# Patient Record
Sex: MALE | Race: WHITE | NOT HISPANIC OR LATINO | Employment: FULL TIME | URBAN - METROPOLITAN AREA
[De-identification: names, ages, dates, MRNs, and addresses within clinical notes are randomized per-mention and may not be internally consistent; named-entity substitution may affect disease eponyms.]

---

## 2024-11-03 ENCOUNTER — HOSPITAL ENCOUNTER (EMERGENCY)
Facility: MEDICAL CENTER | Age: 24
End: 2024-11-03
Attending: EMERGENCY MEDICINE
Payer: COMMERCIAL

## 2024-11-03 VITALS
TEMPERATURE: 98 F | DIASTOLIC BLOOD PRESSURE: 76 MMHG | RESPIRATION RATE: 18 BRPM | HEIGHT: 69 IN | SYSTOLIC BLOOD PRESSURE: 154 MMHG | OXYGEN SATURATION: 98 % | WEIGHT: 273.37 LBS | HEART RATE: 90 BPM | BODY MASS INDEX: 40.49 KG/M2

## 2024-11-03 DIAGNOSIS — S31.109A OPEN WOUND OF ABDOMINAL WALL, INITIAL ENCOUNTER: ICD-10-CM

## 2024-11-03 PROCEDURE — 99282 EMERGENCY DEPT VISIT SF MDM: CPT

## 2024-11-03 RX ORDER — CEPHALEXIN 500 MG/1
500 CAPSULE ORAL 3 TIMES DAILY
Qty: 21 CAPSULE | Refills: 0 | Status: ACTIVE | OUTPATIENT
Start: 2024-11-03 | End: 2024-11-10

## 2024-11-03 ASSESSMENT — PAIN DESCRIPTION - PAIN TYPE: TYPE: ACUTE PAIN

## 2024-11-04 NOTE — ED PROVIDER NOTES
"ED Provider Note    CHIEF COMPLAINT  Chief Complaint   Patient presents with    Abscess     To RLQ abdomen. Reports \"yesterday I took the bandage off and there was blood and pus\". Reports feeling myalgias today. Denies current antibx for same.       EXTERNAL RECORDS REVIEWED  No pertinent records to review    HPI/ROS  LIMITATION TO HISTORY   None  OUTSIDE HISTORIAN(S):  None    Nahid Ghosh is a 24 y.o. male who presents for an area on the right lower abdominal wall of some erythema and bleeding.  The patient cannot recall any trauma.  He specifically denies any penetrating trauma.  He noticed some blood on his underwear the other day when he went to take a shower he noticed some bleeding and drainage of some yellow-green material..  He noticed a small 7 x 8 mm lesion that we had opened up.  He cannot recall any recent ingrown hair.  He has no history of skin malignancy.  He has no fevers or chills..  He denies any significant medical or surgical history.  He is not a diabetic.    PAST MEDICAL HISTORY   has a past medical history of Patient denies medical problems.    SURGICAL HISTORY  patient denies any surgical history  No major surgeries  FAMILY HISTORY  History reviewed. No pertinent family history.    SOCIAL HISTORY  Social History     Tobacco Use    Smoking status: Never    Smokeless tobacco: Never   Substance and Sexual Activity    Alcohol use: Yes     Comment: occ    Drug use: Not Currently    Sexual activity: Not on file   No history of IV drug use    CURRENT MEDICATIONS  Home Medications       Reviewed by Kat Astudillo R.N. (Registered Nurse) on 11/03/24 at 1943  Med List Status: Not Addressed     Medication Last Dose Status        Patient Rodolfo Taking any Medications                           ALLERGIES  No Known Allergies    PHYSICAL EXAM  VITAL SIGNS: BP (!) 177/105   Pulse 97   Temp 36.7 °C (98 °F) (Temporal)   Resp 18   Ht 1.753 m (5' 9\")   Wt 124 kg (273 lb 5.9 oz)   SpO2 98%   BMI " 40.37 kg/m²    Pulse ox interpretation: I interpret this pulse ox as normal.  Constitutional: Alert and oriented x 3, no acute distress  HEENT: Atraumatic normocephalic, pupils are equal round reactive to light extraocular movements are intact. The nares is clear, external ears are normal, mouth shows moist mucous membranes normal dentition for age  Neck: Supple, no JVD no tracheal deviation  Cardiovascular: Regular rate and rhythm no murmur rub or gallop 2+ pulses peripherally x4  Thorax & Lungs: No respiratory distress, no wheezes rales or rhonchi, No chest tenderness.   GI: Soft nontender nondistended positive bowel sounds, no peritoneal signs right lower quadrant has a 7 x 8 mm open wound.  There is small amount of oozing of blood without pus.  There are some mild surrounding erythema.  No fluctuance or abscess.  It does not extend into the perineum and be appears to be superficial.  There does appear to be a very subtle rim of erythema around the lesion as imaged as below    Skin: Warm dry no acute rash or lesion  Musculoskeletal: Moving all extremities with full range and 5 of 5 strength no acute  deformity  Neurologic: Cranial nerves III through XII are grossly intact no sensory deficit no cerebellar dysfunction   Psychiatric: Appropriate affect for situation at this time          EKG/LABS  Considered but not performed  RADIOLOGY/PROCEDURES   No imaging indicated  COURSE & MEDICAL DECISION MAKING    ASSESSMENT, COURSE AND PLAN  Care Narrative:     This is a very pleasant otherwise healthy 24-year-old gentleman with an abdominal wall lesion.  I suspect he may have had a small abscess.  He did report some yellow-green exudate.  Here he has no high fevers chills or fever or tachycardia to suggest sepsis.  I considered but do not feel that blood testing is indicated.  This lesion is already open.  He did report some purulent drainage but there does not appear to be any residual fluctuance to suggest the need for  incision and drainage.  He has no peritoneal signs or significant abdominal wall cellulitis.  There is a very subtle rim of erythema suggesting possible cellulitis.  I considered but do not feel that blood testing or imaging is indicated.  I applied topical antibiotic ointment and sterile dressing.  I will provide him with supplies to change the dressing with antibiotic ointment once daily.  I will place him on Keflex.  I have recommended he establish care with a PCP and/or follow-up for wound check here if symptoms do not improve.          ADDITIONAL PROBLEMS MANAGED      DISPOSITION AND DISCUSSIONS  I have discussed management of the patient with the following physicians and JENIFFER's: None    Discussion of management with other QHP or appropriate source(s): None    Escalation of care considered, and ultimately not performed: Considered laboratory and imaging studies    Barriers to care at this time, including but not limited to: No PCP.     Decision tools and prescription drugs considered including, but not limited to: Patient will be provided Keflex prescription.    FINAL DIAGNOSIS  1. Open wound of abdominal wall, initial encounter  cephALEXin (KEFLEX) 500 MG Cap             Electronically signed by: Al Rizo M.D., 11/3/2024 7:55 PM

## 2024-11-04 NOTE — ED NOTES
Patient given discharge instructions, verbalized understanding. Rx given for keflex. Patient instructed to follow up with PCP or ER for wound recheck. Patient provided education to come to ER if symptoms worsen. Discharged in stable condition, able to walk out with steady gait.

## 2024-11-04 NOTE — ED TRIAGE NOTES
"Chief Complaint   Patient presents with    Abscess     To RLQ abdomen. Reports \"yesterday I took the bandage off and there was blood and pus\". Reports feeling myalgias today. Denies current antibx for same.     Physical Exam  Pulmonary:      Effort: Pulmonary effort is normal.   Skin:     General: Skin is warm and dry.   Neurological:      Mental Status: He is alert.       BP (!) 177/105   Pulse 97   Temp 36.7 °C (98 °F) (Temporal)   Resp 18   Ht 1.753 m (5' 9\")   Wt 124 kg (273 lb 5.9 oz)   SpO2 98%   BMI 40.37 kg/m²     "

## 2024-11-04 NOTE — DISCHARGE INSTRUCTIONS
Apply topical antibiotic ointment and change dressing once daily.  If you develop increasing redness pain swelling fevers or chills return here immediately.  Take full course of antibiotics.  Establish with a primary care doctor.  If he cannot get in with 1 return here for wound check in 2 to 3 days